# Patient Record
Sex: FEMALE | Race: BLACK OR AFRICAN AMERICAN | NOT HISPANIC OR LATINO | Employment: FULL TIME | URBAN - METROPOLITAN AREA
[De-identification: names, ages, dates, MRNs, and addresses within clinical notes are randomized per-mention and may not be internally consistent; named-entity substitution may affect disease eponyms.]

---

## 2020-04-13 ENCOUNTER — PATIENT MESSAGE (OUTPATIENT)
Dept: OBSTETRICS AND GYNECOLOGY | Facility: CLINIC | Age: 35
End: 2020-04-13

## 2020-04-13 ENCOUNTER — DOCUMENTATION ONLY (OUTPATIENT)
Dept: OBSTETRICS AND GYNECOLOGY | Facility: CLINIC | Age: 35
End: 2020-04-13

## 2020-04-13 NOTE — TELEPHONE ENCOUNTER
Please remind pt that bilateral breast mammo and US is due. Order can be sent wherever pt plans to do testing

## 2020-06-29 ENCOUNTER — TELEPHONE (OUTPATIENT)
Dept: OBSTETRICS AND GYNECOLOGY | Facility: CLINIC | Age: 35
End: 2020-06-29

## 2020-06-29 NOTE — TELEPHONE ENCOUNTER
LMVM x1 for pt. To call back to let her know she needs to repeat 6 month f/u diag. Mammo/us (bilateral).

## 2020-07-07 ENCOUNTER — TELEPHONE (OUTPATIENT)
Dept: OBSTETRICS AND GYNECOLOGY | Facility: CLINIC | Age: 35
End: 2020-07-07

## 2020-07-07 NOTE — TELEPHONE ENCOUNTER
Spoke with pt.  Advised her to schedule 6 month f/u for Bilateral Diag. Mammo/US.  Pt states she will call facility today to haroon'd.  She hasnt been able to haroon'd due to Covid 19 restrictions.  Advised her to call me back with date/time.  Pt. Verbalized understanding.

## 2020-08-03 ENCOUNTER — TELEPHONE (OUTPATIENT)
Dept: OBSTETRICS AND GYNECOLOGY | Facility: CLINIC | Age: 35
End: 2020-08-03

## 2020-08-03 NOTE — TELEPHONE ENCOUNTER
Date:  July 22, 2020  [facility])  [phone]  [place], [place]    [facility]. Records is faxing reports to [phone]

## 2020-08-03 NOTE — TELEPHONE ENCOUNTER
----- Message from Keisha Dorman MA sent at 8/3/2020  2:05 PM CDT -----    ----- Message -----  From: Patricia Retana  Sent: 8/3/2020   2:00 PM CDT  To: [facility] Staff    Patient called in regards to let the nurse know she has finished her mammo and ultrasound , please call back at [phone].        Thanks,  Patricia Retana

## 2020-08-06 NOTE — TELEPHONE ENCOUNTER
[facility] Records will try faxing reports again to [phone] and they will also mail copies to our office.

## 2020-08-07 ENCOUNTER — PATIENT MESSAGE (OUTPATIENT)
Dept: OBSTETRICS AND GYNECOLOGY | Facility: CLINIC | Age: 35
End: 2020-08-07

## 2020-12-03 ENCOUNTER — TELEPHONE (OUTPATIENT)
Dept: OBSTETRICS AND GYNECOLOGY | Facility: CLINIC | Age: 35
End: 2020-12-03

## 2020-12-03 NOTE — TELEPHONE ENCOUNTER
Spoke w/pt and informed her all annual appts are backed up into April. Pt scheduled and put on cx list.

## 2020-12-03 NOTE — TELEPHONE ENCOUNTER
----- Message from Giving Assistant Delanson sent at 12/3/2020 10:45 AM CST -----  Regarding: patient appointment access  Contact: patient  Type:  Sooner Apoointment Request    Caller is requesting a sooner appointment.  Caller declined first available appointment listed below.  Caller will not accept being placed on the waitlist and is requesting a message be sent to doctor.  Name of Caller:Mariel Ellis  When is the first available appointment?4/17/20  Symptoms:pt needed to r/s her annual due to having to quarantine   Would the patient rather a call back or a response via [facility]? Call back  Best Call Back Number:[phone]  Additional Information: patient tested negative but still had to quarantine being that she is in the same home with someone who tested positive. Pt was scheduled for tomorrow.

## 2021-02-08 ENCOUNTER — TELEPHONE (OUTPATIENT)
Dept: OBSTETRICS AND GYNECOLOGY | Facility: CLINIC | Age: 36
End: 2021-02-08

## 2021-02-19 ENCOUNTER — TELEPHONE (OUTPATIENT)
Dept: OBSTETRICS AND GYNECOLOGY | Facility: CLINIC | Age: 36
End: 2021-02-19

## 2021-03-12 ENCOUNTER — PATIENT MESSAGE (OUTPATIENT)
Dept: OBSTETRICS AND GYNECOLOGY | Facility: CLINIC | Age: 36
End: 2021-03-12

## 2021-04-07 ENCOUNTER — OFFICE VISIT (OUTPATIENT)
Dept: OBSTETRICS AND GYNECOLOGY | Facility: CLINIC | Age: 36
End: 2021-04-07
Payer: COMMERCIAL

## 2021-04-07 VITALS
SYSTOLIC BLOOD PRESSURE: 120 MMHG | BODY MASS INDEX: 39.3 KG/M2 | WEIGHT: 230.19 LBS | HEART RATE: 80 BPM | DIASTOLIC BLOOD PRESSURE: 80 MMHG | HEIGHT: 64 IN

## 2021-04-07 DIAGNOSIS — N60.01 BILATERAL BREAST CYSTS: ICD-10-CM

## 2021-04-07 DIAGNOSIS — Z01.419 WELL WOMAN EXAM WITH ROUTINE GYNECOLOGICAL EXAM: Primary | ICD-10-CM

## 2021-04-07 DIAGNOSIS — N60.02 BILATERAL BREAST CYSTS: ICD-10-CM

## 2021-04-07 DIAGNOSIS — N95.1 MENOPAUSAL SYMPTOMS: ICD-10-CM

## 2021-04-07 DIAGNOSIS — Z12.31 SCREENING MAMMOGRAM, ENCOUNTER FOR: ICD-10-CM

## 2021-04-07 DIAGNOSIS — Z00.00 LABORATORY EXAM ORDERED AS PART OF ROUTINE GENERAL MEDICAL EXAMINATION: ICD-10-CM

## 2021-04-07 PROCEDURE — 1126F AMNT PAIN NOTED NONE PRSNT: CPT | Mod: S$GLB,,, | Performed by: OBSTETRICS & GYNECOLOGY

## 2021-04-07 PROCEDURE — 1126F PR PAIN SEVERITY QUANTIFIED, NO PAIN PRESENT: ICD-10-PCS | Mod: S$GLB,,, | Performed by: OBSTETRICS & GYNECOLOGY

## 2021-04-07 PROCEDURE — 3008F BODY MASS INDEX DOCD: CPT | Mod: CPTII,S$GLB,, | Performed by: OBSTETRICS & GYNECOLOGY

## 2021-04-07 PROCEDURE — 99395 PR PREVENTIVE VISIT,EST,18-39: ICD-10-PCS | Mod: S$GLB,,, | Performed by: OBSTETRICS & GYNECOLOGY

## 2021-04-07 PROCEDURE — 99395 PREV VISIT EST AGE 18-39: CPT | Mod: S$GLB,,, | Performed by: OBSTETRICS & GYNECOLOGY

## 2021-04-07 PROCEDURE — 3008F PR BODY MASS INDEX (BMI) DOCUMENTED: ICD-10-PCS | Mod: CPTII,S$GLB,, | Performed by: OBSTETRICS & GYNECOLOGY

## 2021-04-09 LAB
CHLAMYDIA: NEGATIVE
GONORRHEA: NEGATIVE
SOURCE: NORMAL
SOURCE: NORMAL
TRICHOMONAS AMPLIFIED: NEGATIVE

## 2021-04-11 LAB
ALBUMIN SERPL-MCNC: 4.1 G/DL (ref 3.6–5.1)
ALBUMIN/GLOB SERPL: 1.4 (CALC) (ref 1–2.5)
ALP SERPL-CCNC: 67 U/L (ref 31–125)
ALT SERPL-CCNC: 10 U/L (ref 6–29)
AST SERPL-CCNC: 12 U/L (ref 10–30)
BASOPHILS # BLD AUTO: 40 CELLS/UL (ref 0–200)
BASOPHILS NFR BLD AUTO: 0.6 %
BILIRUB SERPL-MCNC: 0.5 MG/DL (ref 0.2–1.2)
BUN SERPL-MCNC: 8 MG/DL (ref 7–25)
BUN/CREAT SERPL: NORMAL (CALC) (ref 6–22)
CALCIUM SERPL-MCNC: 9.2 MG/DL (ref 8.6–10.2)
CHLORIDE SERPL-SCNC: 103 MMOL/L (ref 98–110)
CHOLEST SERPL-MCNC: 237 MG/DL
CHOLEST/HDLC SERPL: 3.8 (CALC)
CO2 SERPL-SCNC: 27 MMOL/L (ref 20–32)
CREAT SERPL-MCNC: 0.65 MG/DL (ref 0.5–1.1)
EOSINOPHIL # BLD AUTO: 362 CELLS/UL (ref 15–500)
EOSINOPHIL NFR BLD AUTO: 5.4 %
ERYTHROCYTE [DISTWIDTH] IN BLOOD BY AUTOMATED COUNT: 14.3 % (ref 11–15)
FSH SERPL-ACNC: 7.8 MIU/ML
GFRSERPLBLD MDRD-ARVRAT: 113 ML/MIN/1.73M2
GLOBULIN SER CALC-MCNC: 2.9 G/DL (CALC) (ref 1.9–3.7)
GLUCOSE SERPL-MCNC: 86 MG/DL (ref 65–99)
HCT VFR BLD AUTO: 39.1 % (ref 35–45)
HDLC SERPL-MCNC: 62 MG/DL
HGB BLD-MCNC: 12.6 G/DL (ref 11.7–15.5)
LDLC SERPL CALC-MCNC: 156 MG/DL (CALC)
LH SERPL-ACNC: 6.3 MIU/ML
LYMPHOCYTES # BLD AUTO: 2908 CELLS/UL (ref 850–3900)
LYMPHOCYTES NFR BLD AUTO: 43.4 %
MCH RBC QN AUTO: 26.9 PG (ref 27–33)
MCHC RBC AUTO-ENTMCNC: 32.2 G/DL (ref 32–36)
MCV RBC AUTO: 83.5 FL (ref 80–100)
MONOCYTES # BLD AUTO: 516 CELLS/UL (ref 200–950)
MONOCYTES NFR BLD AUTO: 7.7 %
NEUTROPHILS # BLD AUTO: 2874 CELLS/UL (ref 1500–7800)
NEUTROPHILS NFR BLD AUTO: 42.9 %
NONHDLC SERPL-MCNC: 175 MG/DL (CALC)
PLATELET # BLD AUTO: 284 THOUSAND/UL (ref 140–400)
PMV BLD REES-ECKER: 10.6 FL (ref 7.5–12.5)
POTASSIUM SERPL-SCNC: 4.3 MMOL/L (ref 3.5–5.3)
PROT SERPL-MCNC: 7 G/DL (ref 6.1–8.1)
RBC # BLD AUTO: 4.68 MILLION/UL (ref 3.8–5.1)
SODIUM SERPL-SCNC: 138 MMOL/L (ref 135–146)
T4 FREE SERPL-MCNC: 1 NG/DL (ref 0.8–1.8)
TRIGL SERPL-MCNC: 89 MG/DL
TSH SERPL-ACNC: 2.15 MIU/L
WBC # BLD AUTO: 6.7 THOUSAND/UL (ref 3.8–10.8)

## 2021-07-01 ENCOUNTER — PATIENT MESSAGE (OUTPATIENT)
Dept: ADMINISTRATIVE | Facility: OTHER | Age: 36
End: 2021-07-01

## 2022-03-08 ENCOUNTER — TELEPHONE (OUTPATIENT)
Dept: OBSTETRICS AND GYNECOLOGY | Facility: CLINIC | Age: 37
End: 2022-03-08
Payer: COMMERCIAL

## 2022-03-08 DIAGNOSIS — N30.90 CYSTITIS: Primary | ICD-10-CM

## 2022-03-08 RX ORDER — PHENAZOPYRIDINE HYDROCHLORIDE 200 MG/1
200 TABLET, FILM COATED ORAL 3 TIMES DAILY PRN
Qty: 6 TABLET | Refills: 0 | Status: SHIPPED | OUTPATIENT
Start: 2022-03-08 | End: 2022-04-08

## 2022-03-08 RX ORDER — SULFAMETHOXAZOLE AND TRIMETHOPRIM 800; 160 MG/1; MG/1
1 TABLET ORAL 2 TIMES DAILY
Qty: 14 TABLET | Refills: 0 | Status: SHIPPED | OUTPATIENT
Start: 2022-03-08 | End: 2022-04-08

## 2022-03-08 NOTE — TELEPHONE ENCOUNTER
----- Message from Machelle Bobby sent at 3/7/2022  3:07 PM CST -----  Contact: Patient  Patient need a RX called in for a bladder infection              .  [facility]   [address] [place] [address]        Please call the patient back at [phone]

## 2022-03-08 NOTE — TELEPHONE ENCOUNTER
Patient did over the counter Urinalysis and it showed leucocytes. She also is very painful to urinate  Please advise  Thanks

## 2022-04-08 ENCOUNTER — OFFICE VISIT (OUTPATIENT)
Dept: OBSTETRICS AND GYNECOLOGY | Facility: CLINIC | Age: 37
End: 2022-04-08
Payer: COMMERCIAL

## 2022-04-08 VITALS
DIASTOLIC BLOOD PRESSURE: 66 MMHG | SYSTOLIC BLOOD PRESSURE: 150 MMHG | BODY MASS INDEX: 37.11 KG/M2 | WEIGHT: 216.19 LBS

## 2022-04-08 DIAGNOSIS — N60.01 BILATERAL BREAST CYSTS: ICD-10-CM

## 2022-04-08 DIAGNOSIS — Z01.419 WELL WOMAN EXAM WITH ROUTINE GYNECOLOGICAL EXAM: Primary | ICD-10-CM

## 2022-04-08 DIAGNOSIS — N60.02 BILATERAL BREAST CYSTS: ICD-10-CM

## 2022-04-08 PROCEDURE — 99395 PR PREVENTIVE VISIT,EST,18-39: ICD-10-PCS | Mod: S$GLB,,, | Performed by: OBSTETRICS & GYNECOLOGY

## 2022-04-08 PROCEDURE — 99395 PREV VISIT EST AGE 18-39: CPT | Mod: S$GLB,,, | Performed by: OBSTETRICS & GYNECOLOGY

## 2022-04-08 NOTE — PROGRESS NOTES
"Mariel Ellis is a 36 y.o. female  who presents for a well woman exam.  No issues, problems, or complaints.  She moved to [place], [place] in October.     Past Medical History:   Diagnosis Date    Colon polyp     Diverticulosis     Polyp of stomach     Thyroid nodule      Past Surgical History:   Procedure Laterality Date    APPENDECTOMY      breast marker Right 2021    COLECTOMY      age 1 for obstruction    COLONOSCOPY      Dec 2010 and Oct 2019    REDUCTION OF BOTH BREASTS      SPINAL FUSION  2017    TONSILLECTOMY AND ADENOIDECTOMY      TOTAL ABDOMINAL HYSTERECTOMY  2015    with bilateral salpingectomy and bilateral ovarian cystectomy    UMBILICAL HERNIA REPAIR      WISDOM TOOTH EXTRACTION       OB History    Para Term  AB Living   0 0 0 0 0 0   SAB IAB Ectopic Multiple Live Births   0 0 0 0 0      Family History   Problem Relation Age of Onset    Breast cancer Maternal Grandmother     Prostatitis Father     No Known Problems Mother      Social History     Tobacco Use    Smoking status: Never Smoker    Smokeless tobacco: Never Used   Substance Use Topics    Alcohol use: Yes     Comment: "occasionally"    Drug use: Never     No current outpatient medications on file.   Review of patient's allergies indicates:  No Known Allergies     ROS:  GENERAL: Denies weight gain or weight loss. Feeling well overall.   SKIN: Denies rash or lesions.   HEAD: Denies head injury or headache.   NODES: Denies enlarged lymph nodes.   CHEST: Denies shortness of breath.   CARDIOVASCULAR: Denies abdominal pain, constipation, diarrhea, nausea, vomiting or rectal bleeding.   URINARY: Denies frequency, dysuria, hematuria, or burning on urination.  REPRODUCTIVE: See HPI.   BREASTS: Denies pain, lumps, or nipple discharge.   HEMATOLOGIC: Denies easy bruisability or excessive bleeding.   MUSCULOSKELETAL: Denies joint pain or swelling.   NEUROLOGIC: Denies syncope or weakness. "   PSYCHIATRIC: Denies depression, anxiety or mood swings.    PHYSICAL EXAM:    BP (!) 150/66   Wt 98.1 kg (216 lb 3.2 oz)   BMI 37.11 kg/m²    Body mass index is 37.11 kg/m².     APPEARANCE: Well nourished, well developed, in no acute distress.  AFFECT: WNL, alert and oriented x 3  SKIN: No acne or hirsutism  NECK: Neck symmetric without masses or thyromegaly  NODES: No inguinal, cervical, axillary, or femoral lymph node enlargement  CHEST: Good respiratory effect  ABDOMEN: Soft.  No tenderness or masses.  No hepatosplenomegaly.  No hernias.  BREASTS: Symmetrical, no skin changes or visible lesions.  No palpable masses, nipple discharge bilaterally.  PELVIC: Normal external genitalia without lesions.  Normal hair distribution.  Adequate perineal body, normal urethral meatus.  Urethra and bladder without tenderness or masses. Vagina moist and well rugated without lesions or discharge.  No significant cystocele or rectocele.  Bimanual exam shows adnexa without masses or tenderness.    EXTREMITIES: No edema.     Well woman exam with routine gynecological exam    Bilateral breast cysts  -     Mammo Digital Diagnostic Bilat; Future; Expected date: 04/08/2022  -     US Breast Bilateral Complete; Future; Expected date: 04/08/2022         Pap/HPV negative 4/7/21    Patient was counseled today on A.C.S. Pap guidelines and recommendations for yearly pelvic exams, mammograms and monthly self breast exams; to see her PCP for other health maintenance.     Follow up in 1 year

## 2022-05-06 ENCOUNTER — TELEPHONE (OUTPATIENT)
Dept: OBSTETRICS AND GYNECOLOGY | Facility: CLINIC | Age: 37
End: 2022-05-06
Payer: COMMERCIAL

## 2022-05-06 DIAGNOSIS — Z12.31 SCREENING MAMMOGRAM, ENCOUNTER FOR: Primary | ICD-10-CM

## 2022-05-06 NOTE — TELEPHONE ENCOUNTER
----- Message from Machelle Bobby sent at 5/6/2022  8:27 AM CDT -----  Contact: Patient  Patient need the the code changed on her mammogram order due to insurance purposes.    The order cannot say diagnostic mammogram it has to say regular mammogram    Patient has switched insurances.    Patient need it sent to [facility] in [place], [place]      Patient need the nurse to call her   # [phone]

## 2022-06-08 ENCOUNTER — PATIENT MESSAGE (OUTPATIENT)
Dept: OBSTETRICS AND GYNECOLOGY | Facility: CLINIC | Age: 37
End: 2022-06-08
Payer: COMMERCIAL

## 2022-06-10 ENCOUNTER — OFFICE VISIT (OUTPATIENT)
Dept: OBSTETRICS AND GYNECOLOGY | Facility: CLINIC | Age: 37
End: 2022-06-10
Payer: COMMERCIAL

## 2022-06-10 VITALS
WEIGHT: 216 LBS | HEART RATE: 56 BPM | SYSTOLIC BLOOD PRESSURE: 121 MMHG | DIASTOLIC BLOOD PRESSURE: 79 MMHG | BODY MASS INDEX: 36.88 KG/M2 | HEIGHT: 64 IN

## 2022-06-10 DIAGNOSIS — R19.8 UMBILICAL DISCHARGE: Primary | ICD-10-CM

## 2022-06-10 PROCEDURE — 99213 OFFICE O/P EST LOW 20 MIN: CPT | Mod: S$GLB,,, | Performed by: OBSTETRICS & GYNECOLOGY

## 2022-06-10 PROCEDURE — 99213 PR OFFICE/OUTPT VISIT, EST, LEVL III, 20-29 MIN: ICD-10-PCS | Mod: S$GLB,,, | Performed by: OBSTETRICS & GYNECOLOGY

## 2022-06-10 NOTE — PROGRESS NOTES
"  Chief Complaint: umbilical discharge and bleeding     HPI:      Mariel Ellis is a 37 y.o. female  who presents complaining of umbilical bleeding discharge and swelling.  She notes a foul odor as well.  Was seen at an urgent care and given antibiotics.  No LMP recorded. Patient has had a hysterectomy.    Past Medical History:   Diagnosis Date    Amenorrhea     Anxiety     Clotting disorder     Colon polyp     Depression     Diverticulosis     Fibroid     Polyp of stomach     Thyroid nodule       Past Surgical History:   Procedure Laterality Date    APPENDECTOMY      breast marker Right 2021    BREAST SURGERY      COLECTOMY      age 1 for obstruction    COLONOSCOPY      Dec 2010 and Oct 2019    COLPOSCOPY      REDUCTION OF BOTH BREASTS      SPINAL FUSION  2017    TONSILLECTOMY AND ADENOIDECTOMY      TOTAL ABDOMINAL HYSTERECTOMY  2015    with bilateral salpingectomy and bilateral ovarian cystectomy    UMBILICAL HERNIA REPAIR      WISDOM TOOTH EXTRACTION        Social History     Tobacco Use    Smoking status: Never Smoker    Smokeless tobacco: Never Used   Substance Use Topics    Alcohol use: Yes     Comment: "occasionally"    Drug use: Never      No current outpatient medications on file prior to visit.     No current facility-administered medications on file prior to visit.      Review of patient's allergies indicates:  No Known Allergies       ROS:     GENERAL: Denies weight gain or weight loss. Feeling well overall.   SKIN: Denies rash or lesions.   NODES: Denies enlarged lymph nodes.   CARDIOVASCULAR: Denies palpitations or chest pain.   ABDOMEN: Denies abdominal pain, constipation, diarrhea, nausea, vomiting or rectal bleeding.   URINARY: Denies frequency, dysuria, hematuria, or burning on urination.  REPRODUCTIVE: See HPI.   BREASTS: Denies pain, lumps, or nipple discharge.   HEMATOLOGIC: Denies easy bruisability or excessive bleeding with the exception of " "menstrual cycles.  PSYCHIATRIC: Denies depression, anxiety or mood swings.   Physical Exam:      /79   Pulse (!) 56   Ht 5' 4" (1.626 m)   Wt 98 kg (216 lb)   BMI 37.08 kg/m²   Body mass index is 37.08 kg/m².     ABDOMEN: umbilicus with no visible lesion; +blood tinged drainage with palpable protrusion with cough        Assessment/Plan:     Umbilical discharge  -     Aerobic culture    Discussed referral to general surgeon. She will call with whoever is on her provider list close to home. She will use hibiclens with showering.   "

## 2022-06-15 DIAGNOSIS — R19.8 UMBILICAL DISCHARGE: Primary | ICD-10-CM

## 2022-06-15 RX ORDER — CIPROFLOXACIN 500 MG/1
500 TABLET ORAL 2 TIMES DAILY
Qty: 14 TABLET | Refills: 0 | Status: SHIPPED | OUTPATIENT
Start: 2022-06-15 | End: 2022-06-22

## 2022-06-16 LAB — CULTURE: NORMAL

## 2022-08-25 ENCOUNTER — TELEPHONE (OUTPATIENT)
Dept: OBSTETRICS AND GYNECOLOGY | Facility: CLINIC | Age: 37
End: 2022-08-25
Payer: COMMERCIAL

## 2022-09-02 ENCOUNTER — PROCEDURE VISIT (OUTPATIENT)
Dept: OBSTETRICS AND GYNECOLOGY | Facility: CLINIC | Age: 37
End: 2022-09-02
Payer: COMMERCIAL

## 2022-09-02 ENCOUNTER — OFFICE VISIT (OUTPATIENT)
Dept: OBSTETRICS AND GYNECOLOGY | Facility: CLINIC | Age: 37
End: 2022-09-02
Payer: COMMERCIAL

## 2022-09-02 VITALS
BODY MASS INDEX: 37.32 KG/M2 | SYSTOLIC BLOOD PRESSURE: 126 MMHG | WEIGHT: 217.38 LBS | DIASTOLIC BLOOD PRESSURE: 89 MMHG

## 2022-09-02 DIAGNOSIS — N80.9 ENDOMETRIOSIS: Primary | ICD-10-CM

## 2022-09-02 DIAGNOSIS — N80.9 ENDOMETRIOSIS: ICD-10-CM

## 2022-09-02 PROCEDURE — 99213 OFFICE O/P EST LOW 20 MIN: CPT | Mod: S$GLB,,, | Performed by: OBSTETRICS & GYNECOLOGY

## 2022-09-02 PROCEDURE — 99213 PR OFFICE/OUTPT VISIT, EST, LEVL III, 20-29 MIN: ICD-10-PCS | Mod: S$GLB,,, | Performed by: OBSTETRICS & GYNECOLOGY

## 2022-09-02 RX ORDER — ELAGOLIX 150 MG/1
150 TABLET, FILM COATED ORAL DAILY
Qty: 28 TABLET | Refills: 11 | Status: SHIPPED | OUTPATIENT
Start: 2022-09-02 | End: 2022-09-07 | Stop reason: SDUPTHER

## 2022-09-02 RX ORDER — NORETHINDRONE 5 MG/1
5 TABLET ORAL DAILY
Qty: 30 TABLET | Refills: 11 | Status: SHIPPED | OUTPATIENT
Start: 2022-09-02 | End: 2023-09-02

## 2022-09-02 NOTE — PROGRESS NOTES
"  Chief Complaint: endometriosis found during umbilical surgery     HPI:      Mariel Ellis is a 37 y.o. female  who presents after surgery for umbilical hernia in fact showed endometriosis and adhesions at the umbilicus.  No LMP recorded. Patient has had a hysterectomy.    Past Medical History:   Diagnosis Date    Amenorrhea     Anxiety     Clotting disorder     Colon polyp     Depression     Diverticulosis     Fibroid     Polyp of stomach 2010    Thyroid nodule       Past Surgical History:   Procedure Laterality Date    APPENDECTOMY      breast marker Right 2021    BREAST SURGERY      COLECTOMY      age 1 for obstruction    COLONOSCOPY      Dec 2010 and Oct 2019    COLPOSCOPY      REDUCTION OF BOTH BREASTS      SPINAL FUSION  2017    TONSILLECTOMY AND ADENOIDECTOMY      TOTAL ABDOMINAL HYSTERECTOMY  2015    with bilateral salpingectomy and bilateral ovarian cystectomy    WISDOM TOOTH EXTRACTION        Social History     Tobacco Use    Smoking status: Never    Smokeless tobacco: Never   Substance Use Topics    Alcohol use: Yes     Comment: "occasionally"    Drug use: Never      No current outpatient medications on file prior to visit.     No current facility-administered medications on file prior to visit.      Review of patient's allergies indicates:  No Known Allergies       ROS:     GENERAL: Denies weight gain or weight loss. Feeling well overall.   SKIN: Denies rash or lesions.   NODES: Denies enlarged lymph nodes.   CARDIOVASCULAR: Denies palpitations or chest pain.   ABDOMEN: Denies  diarrhea, nausea, vomiting or rectal bleeding.   URINARY: Denies frequency, dysuria, hematuria, or burning on urination.  REPRODUCTIVE: See HPI.   BREASTS: Denies pain, lumps, or nipple discharge.   HEMATOLOGIC: Denies easy bruisability or excessive bleeding   PSYCHIATRIC: Denies depression, anxiety or mood swings.   Physical Exam:      /89   Wt 98.6 kg (217 lb 6.4 oz)   BMI 37.32 kg/m²   Body mass " index is 37.32 kg/m².       Assessment/Plan:     Endometriosis  -     US OB/GYN Procedure ([facility]); Future  -     norethindrone (AYGESTIN) 5 mg Tab; Take 1 tablet (5 mg total) by mouth once daily.  Dispense: 30 tablet; Refill: 11  -     elagolix (ORILISSA) 150 mg Tab; Take 150 mg by mouth once daily at 6am.  Dispense: 28 tablet; Refill: 11  Follow up in 3 months

## 2022-09-07 ENCOUNTER — TELEPHONE (OUTPATIENT)
Dept: OBSTETRICS AND GYNECOLOGY | Facility: CLINIC | Age: 37
End: 2022-09-07
Payer: COMMERCIAL

## 2022-09-07 DIAGNOSIS — N80.9 ENDOMETRIOSIS: ICD-10-CM

## 2022-09-07 RX ORDER — ELAGOLIX 150 MG/1
150 TABLET, FILM COATED ORAL DAILY
Qty: 28 TABLET | Refills: 11 | Status: SHIPPED | OUTPATIENT
Start: 2022-09-07

## 2022-09-07 NOTE — TELEPHONE ENCOUNTER
----- Message from Machelle Bobby sent at 9/7/2022  9:16 AM CDT -----  Regarding: Prior Auth  Contact: [phone]  Please call Gloria from [facility] regarding a prior auth on the below Rx.    Was auth received by the office?      elagolix (ORILISSA) 150 mg Tab        Call back #  [phone]     REF #  [id number]

## 2022-10-20 ENCOUNTER — TELEPHONE (OUTPATIENT)
Dept: PHARMACY | Facility: CLINIC | Age: 37
End: 2022-10-20
Payer: COMMERCIAL

## 2022-12-14 ENCOUNTER — PATIENT MESSAGE (OUTPATIENT)
Dept: OBSTETRICS AND GYNECOLOGY | Facility: CLINIC | Age: 37
End: 2022-12-14
Payer: COMMERCIAL